# Patient Record
Sex: MALE | Race: WHITE | NOT HISPANIC OR LATINO
[De-identification: names, ages, dates, MRNs, and addresses within clinical notes are randomized per-mention and may not be internally consistent; named-entity substitution may affect disease eponyms.]

---

## 2020-04-28 ENCOUNTER — TRANSCRIPTION ENCOUNTER (OUTPATIENT)
Age: 40
End: 2020-04-28

## 2020-11-27 ENCOUNTER — EMERGENCY (EMERGENCY)
Facility: HOSPITAL | Age: 40
LOS: 0 days | Discharge: HOME | End: 2020-11-27
Attending: EMERGENCY MEDICINE
Payer: COMMERCIAL

## 2020-11-27 VITALS
HEART RATE: 65 BPM | DIASTOLIC BLOOD PRESSURE: 80 MMHG | RESPIRATION RATE: 18 BRPM | SYSTOLIC BLOOD PRESSURE: 124 MMHG | TEMPERATURE: 98 F | OXYGEN SATURATION: 95 %

## 2020-11-27 VITALS — WEIGHT: 160.06 LBS | HEIGHT: 69 IN

## 2020-11-27 DIAGNOSIS — R00.2 PALPITATIONS: ICD-10-CM

## 2020-11-27 DIAGNOSIS — R42 DIZZINESS AND GIDDINESS: ICD-10-CM

## 2020-11-27 LAB
ALBUMIN SERPL ELPH-MCNC: 4.2 G/DL — SIGNIFICANT CHANGE UP (ref 3.3–5)
ALP SERPL-CCNC: 90 U/L — SIGNIFICANT CHANGE UP (ref 40–120)
ALT FLD-CCNC: 31 U/L — SIGNIFICANT CHANGE UP (ref 12–78)
ANION GAP SERPL CALC-SCNC: 4 MMOL/L — LOW (ref 5–17)
AST SERPL-CCNC: 26 U/L — SIGNIFICANT CHANGE UP (ref 15–37)
BASOPHILS # BLD AUTO: 0.05 K/UL — SIGNIFICANT CHANGE UP (ref 0–0.2)
BASOPHILS NFR BLD AUTO: 0.8 % — SIGNIFICANT CHANGE UP (ref 0–2)
BILIRUB SERPL-MCNC: 0.3 MG/DL — SIGNIFICANT CHANGE UP (ref 0.2–1.2)
BUN SERPL-MCNC: 23 MG/DL — SIGNIFICANT CHANGE UP (ref 7–23)
CALCIUM SERPL-MCNC: 9.3 MG/DL — SIGNIFICANT CHANGE UP (ref 8.5–10.1)
CHLORIDE SERPL-SCNC: 109 MMOL/L — HIGH (ref 96–108)
CO2 SERPL-SCNC: 29 MMOL/L — SIGNIFICANT CHANGE UP (ref 22–31)
CREAT SERPL-MCNC: 1.01 MG/DL — SIGNIFICANT CHANGE UP (ref 0.5–1.3)
EOSINOPHIL # BLD AUTO: 0.21 K/UL — SIGNIFICANT CHANGE UP (ref 0–0.5)
EOSINOPHIL NFR BLD AUTO: 3.2 % — SIGNIFICANT CHANGE UP (ref 0–6)
GLUCOSE SERPL-MCNC: 86 MG/DL — SIGNIFICANT CHANGE UP (ref 70–99)
HCT VFR BLD CALC: 41.5 % — SIGNIFICANT CHANGE UP (ref 39–50)
HGB BLD-MCNC: 14.6 G/DL — SIGNIFICANT CHANGE UP (ref 13–17)
IMM GRANULOCYTES NFR BLD AUTO: 0.3 % — SIGNIFICANT CHANGE UP (ref 0–1.5)
LYMPHOCYTES # BLD AUTO: 2.45 K/UL — SIGNIFICANT CHANGE UP (ref 1–3.3)
LYMPHOCYTES # BLD AUTO: 37.2 % — SIGNIFICANT CHANGE UP (ref 13–44)
MAGNESIUM SERPL-MCNC: 2.3 MG/DL — SIGNIFICANT CHANGE UP (ref 1.6–2.6)
MCHC RBC-ENTMCNC: 30.2 PG — SIGNIFICANT CHANGE UP (ref 27–34)
MCHC RBC-ENTMCNC: 35.2 GM/DL — SIGNIFICANT CHANGE UP (ref 32–36)
MCV RBC AUTO: 85.7 FL — SIGNIFICANT CHANGE UP (ref 80–100)
MONOCYTES # BLD AUTO: 0.63 K/UL — SIGNIFICANT CHANGE UP (ref 0–0.9)
MONOCYTES NFR BLD AUTO: 9.6 % — SIGNIFICANT CHANGE UP (ref 2–14)
NEUTROPHILS # BLD AUTO: 3.22 K/UL — SIGNIFICANT CHANGE UP (ref 1.8–7.4)
NEUTROPHILS NFR BLD AUTO: 48.9 % — SIGNIFICANT CHANGE UP (ref 43–77)
NT-PROBNP SERPL-SCNC: 51 PG/ML — SIGNIFICANT CHANGE UP (ref 0–125)
PLATELET # BLD AUTO: 177 K/UL — SIGNIFICANT CHANGE UP (ref 150–400)
POTASSIUM SERPL-MCNC: 3.7 MMOL/L — SIGNIFICANT CHANGE UP (ref 3.5–5.3)
POTASSIUM SERPL-SCNC: 3.7 MMOL/L — SIGNIFICANT CHANGE UP (ref 3.5–5.3)
PROT SERPL-MCNC: 7.1 GM/DL — SIGNIFICANT CHANGE UP (ref 6–8.3)
RBC # BLD: 4.84 M/UL — SIGNIFICANT CHANGE UP (ref 4.2–5.8)
RBC # FLD: 11.7 % — SIGNIFICANT CHANGE UP (ref 10.3–14.5)
SODIUM SERPL-SCNC: 142 MMOL/L — SIGNIFICANT CHANGE UP (ref 135–145)
TROPONIN I SERPL-MCNC: <0.015 NG/ML — SIGNIFICANT CHANGE UP (ref 0.01–0.04)
TROPONIN I SERPL-MCNC: <0.015 NG/ML — SIGNIFICANT CHANGE UP (ref 0.01–0.04)
WBC # BLD: 6.58 K/UL — SIGNIFICANT CHANGE UP (ref 3.8–10.5)
WBC # FLD AUTO: 6.58 K/UL — SIGNIFICANT CHANGE UP (ref 3.8–10.5)

## 2020-11-27 PROCEDURE — 83880 ASSAY OF NATRIURETIC PEPTIDE: CPT

## 2020-11-27 PROCEDURE — 99284 EMERGENCY DEPT VISIT MOD MDM: CPT

## 2020-11-27 PROCEDURE — 85025 COMPLETE CBC W/AUTO DIFF WBC: CPT

## 2020-11-27 PROCEDURE — 99284 EMERGENCY DEPT VISIT MOD MDM: CPT | Mod: 25

## 2020-11-27 PROCEDURE — 71045 X-RAY EXAM CHEST 1 VIEW: CPT | Mod: 26

## 2020-11-27 PROCEDURE — 36415 COLL VENOUS BLD VENIPUNCTURE: CPT

## 2020-11-27 PROCEDURE — 84443 ASSAY THYROID STIM HORMONE: CPT

## 2020-11-27 PROCEDURE — 71045 X-RAY EXAM CHEST 1 VIEW: CPT

## 2020-11-27 PROCEDURE — 93010 ELECTROCARDIOGRAM REPORT: CPT

## 2020-11-27 PROCEDURE — 84484 ASSAY OF TROPONIN QUANT: CPT

## 2020-11-27 PROCEDURE — 80053 COMPREHEN METABOLIC PANEL: CPT

## 2020-11-27 PROCEDURE — 93005 ELECTROCARDIOGRAM TRACING: CPT

## 2020-11-27 PROCEDURE — 83735 ASSAY OF MAGNESIUM: CPT

## 2020-11-27 NOTE — ED STATDOCS - NS_ ATTENDINGSCRIBEDETAILS _ED_A_ED_FT
I, Timmy Pickering MD,  performed the initial face to face bedside interview with this patient regarding history of present illness, review of symptoms and relevant past medical, social and family history.  I completed an independent physical examination.    The history, relevant review of systems, past medical and surgical history, medical decision making, and physical examination was documented by the scribe in my presence and I attest to the accuracy of the documentation.

## 2020-11-27 NOTE — ED STATDOCS - OBJECTIVE STATEMENT
41 y/o male presents to the ED c/o palpitations since this afternoon. Pt states he was hanging greg light when he felt sudden palpitations and mild dizziness. Pt went inside and tried to relax but with continued rapid and pounding palpitations with dizziness. Denies shortness of breath. Pt has a home pulse ox and showed his HR was in the mid 90s and "consistently above 100". Pt sporadically felt palpitations in the past but would resolve on its own. Had normal physical w/u last year. No current cardiologist. Non smoker. Drink 2 cups of coffee daily. 39 y/o male presents to the ED c/o palpitations since this afternoon. Pt states he was hanging greg light when he felt sudden palpitations and mild dizziness. Pt went inside and tried to relax but with continued rapid and pounding palpitations with dizziness. Denies shortness of breath. Pt has a home pulse ox and showed his HR was in the mid 90s and "consistently above 100" and went up to 180s. Pt sporadically felt palpitations in the past but would resolve on its own but today palpitations lasted longer than normal. Had normal physical w/u last year. No current cardiologist. Non smoker. Drink 2 cups of coffee daily.

## 2020-11-27 NOTE — ED STATDOCS - CARE PROVIDER_API CALL
Farhat Barnes  CARDIOVASCULAR DISEASE  [address], [address]  [place] [address]  Phone: [phone]  Fax: [phone]  Follow Up Time: Urgent

## 2020-11-27 NOTE — ED STATDOCS - PROGRESS NOTE DETAILS
41 y/o M presents with palpitations. Pt reports hx of palpitations 1 or twice a month last 10 seconds. Today he was hanging up greg light around 2pm and felt palpitations and lightheadedness. Pt reports 20 more episodes throughout the afternoon all lasting approx 10 seconds. Pt reports putting HR monitor and showed rate of 180. Denies fever/chills, n/v/d, CP, SOB, abd pain, leg swelling, or other complaints at this time. -Meek Mcgrath PA-C Results reviewed and discussed with pt. pt with no palpitations throughout stay in ED, workup unremarkable, no episodes on cardiac monitor, will dc with cardio FU. Discussed importance of close FU with PMD. Pt asked to return to ED immediately for any new or concerning sx or worsening. Pt acknowledges and understands plan

## 2020-11-27 NOTE — ED STATDOCS - CHPI ED QUALITY
HEART BEAT, SLOW/HEART BEAT, POUNDING pounding/HEART BEAT, SLOW HEART BEAT, POUNDING/heart beat, rapid

## 2020-11-27 NOTE — ED STATDOCS - PATIENT PORTAL LINK FT
You can access the FollowMyHealth Patient Portal offered by [facility] by registering at the following website: [email or web address]. By joining [employer]’s FollowMyHealth portal, you will also be able to view your health information using other applications (apps) compatible with our system.

## 2020-11-27 NOTE — ED ADULT NURSE NOTE - OBJECTIVE STATEMENT
Patient comes to ED for dizziness and palpitations that began this afternoon while hanging greg lights. Patient reports at home taking HR and was showing above 100 and then 1 reading at 180. Patient denies any SOB. Patient reports similar episode in past that resolved on own.

## 2020-12-04 ENCOUNTER — APPOINTMENT (OUTPATIENT)
Dept: CARDIOLOGY | Facility: CLINIC | Age: 40
End: 2020-12-04
Payer: COMMERCIAL

## 2020-12-04 ENCOUNTER — NON-APPOINTMENT (OUTPATIENT)
Age: 40
End: 2020-12-04

## 2020-12-04 VITALS
WEIGHT: 159 LBS | HEART RATE: 67 BPM | DIASTOLIC BLOOD PRESSURE: 86 MMHG | HEIGHT: 68 IN | OXYGEN SATURATION: 98 % | SYSTOLIC BLOOD PRESSURE: 128 MMHG | BODY MASS INDEX: 24.1 KG/M2

## 2020-12-04 DIAGNOSIS — R00.0 TACHYCARDIA, UNSPECIFIED: ICD-10-CM

## 2020-12-04 PROCEDURE — 99072 ADDL SUPL MATRL&STAF TM PHE: CPT

## 2020-12-04 PROCEDURE — 99204 OFFICE O/P NEW MOD 45 MIN: CPT

## 2020-12-04 PROCEDURE — 93000 ELECTROCARDIOGRAM COMPLETE: CPT

## 2020-12-04 NOTE — HISTORY OF PRESENT ILLNESS
[FreeTextEntry1] : Cecil Burnett is a 40-year-old man with no chronic medical problems who presented to the [facility] emergency department on November 27, 2020 with complaints of palpitations and "fluctuating heart rate." He was in his usual state of good health and decorating his house with [place] lights when he suddenly experienced palpitations accompanied by mild dizziness.  He describes multiple episodes of palpitations of this day -- each lasting ~ 10 seconds and resolving spontaneously; unable to identify exacerbating or alleviating factors; home pulse oximeter revealed the presence of tachycardia (heart rate often greater than 100 but went as high as 180 beats per minute).  He has no past history of heart disease.  There has been no recurrence of symptoms since November 27.  He describes a good baseline functional status and exercise capacity. He consumes small amounts of caffeine (1-2 cups of coffee per day).  There is no family history of premature heart disease or sudden cardiac death.\par \par I reviewed hospital data:  Vital signs are temperature 98.1, heart rate 91, blood pressure 126/88, respiratory rate 18, SpO2 98%. Electrocardiogram revealed normal sinus rhythm with sinus arrhythmia at a rate of 90 beats per minute. Labs, including CBC, metabolic panel, and 2 assays of troponin were normal; chest radiograph was also normal - he was discharged after a period of observation.\par

## 2020-12-04 NOTE — REVIEW OF SYSTEMS
[Palpitations] : palpitations [Dizziness] : dizziness [Under Stress] : under stress [Negative] : Heme/Lymph

## 2020-12-04 NOTE — PHYSICAL EXAM
[Normal Appearance] : normal appearance [Well Groomed] : well groomed [General Appearance - In No Acute Distress] : no acute distress [Eyelids - No Xanthelasma] : the eyelids demonstrated no xanthelasmas [Heart Rate And Rhythm] : heart rate and rhythm were normal [Heart Sounds] : normal S1 and S2 [Murmurs] : no murmurs present [Edema] : no peripheral edema present [] : no respiratory distress [Respiration, Rhythm And Depth] : normal respiratory rhythm and effort [Bowel Sounds] : normal bowel sounds [Abnormal Walk] : normal gait [Nail Clubbing] : no clubbing of the fingernails [Cyanosis, Localized] : no localized cyanosis [Skin Color & Pigmentation] : normal skin color and pigmentation [Oriented To Time, Place, And Person] : oriented to person, place, and time [Impaired Insight] : insight and judgment were intact [Affect] : the affect was normal [Mood] : the mood was normal [FreeTextEntry1] : No JVD

## 2020-12-04 NOTE — ASSESSMENT
[FreeTextEntry1] : Cecil Burnett is a healthy 40-year-old man with no chronic medical problems but now experiencing intermittent palpitations with a severe episode on November 27, 2020 resulting in hospital ER evaluation; episodes of paroxysmal and history is suggestive of supraventricular tachycardia.  We discussed various approaches to diagnosis and management -- unfortunately, episodes are so infrequent (less than monthly) that capturing the arrhythmia on ECG will be difficult (we discussed the use of 24-hour Holter monitor, extended length Holter monitor, implantable loop recorder; as well as consumer [employer] devices).  I recommend echocardiography; he would like to defer rhythm monitoring at this time.

## 2020-12-14 ENCOUNTER — APPOINTMENT (OUTPATIENT)
Dept: CARDIOLOGY | Facility: CLINIC | Age: 40
End: 2020-12-14
Payer: COMMERCIAL

## 2020-12-14 PROCEDURE — 93306 TTE W/DOPPLER COMPLETE: CPT

## 2020-12-16 ENCOUNTER — APPOINTMENT (OUTPATIENT)
Dept: CARDIOLOGY | Facility: CLINIC | Age: 40
End: 2020-12-16

## 2020-12-17 ENCOUNTER — EMERGENCY (EMERGENCY)
Facility: HOSPITAL | Age: 40
LOS: 0 days | Discharge: HOME | End: 2020-12-17
Attending: EMERGENCY MEDICINE
Payer: COMMERCIAL

## 2020-12-17 VITALS
HEART RATE: 94 BPM | TEMPERATURE: 99 F | RESPIRATION RATE: 17 BRPM | OXYGEN SATURATION: 100 % | DIASTOLIC BLOOD PRESSURE: 70 MMHG | SYSTOLIC BLOOD PRESSURE: 115 MMHG

## 2020-12-17 VITALS
HEART RATE: 97 BPM | HEIGHT: 69 IN | DIASTOLIC BLOOD PRESSURE: 81 MMHG | SYSTOLIC BLOOD PRESSURE: 113 MMHG | TEMPERATURE: 99 F | RESPIRATION RATE: 15 BRPM

## 2020-12-17 DIAGNOSIS — R55 SYNCOPE AND COLLAPSE: ICD-10-CM

## 2020-12-17 DIAGNOSIS — R00.0 TACHYCARDIA, UNSPECIFIED: ICD-10-CM

## 2020-12-17 DIAGNOSIS — I47.1 SUPRAVENTRICULAR TACHYCARDIA: ICD-10-CM

## 2020-12-17 DIAGNOSIS — E83.39 OTHER DISORDERS OF PHOSPHORUS METABOLISM: ICD-10-CM

## 2020-12-17 DIAGNOSIS — Z20.828 CONTACT WITH AND (SUSPECTED) EXPOSURE TO OTHER VIRAL COMMUNICABLE DISEASES: ICD-10-CM

## 2020-12-17 DIAGNOSIS — R00.2 PALPITATIONS: ICD-10-CM

## 2020-12-17 LAB
ALBUMIN SERPL ELPH-MCNC: 3.6 G/DL — SIGNIFICANT CHANGE UP (ref 3.3–5)
ALP SERPL-CCNC: 82 U/L — SIGNIFICANT CHANGE UP (ref 40–120)
ALT FLD-CCNC: 36 U/L — SIGNIFICANT CHANGE UP (ref 12–78)
ANION GAP SERPL CALC-SCNC: 3 MMOL/L — LOW (ref 5–17)
APPEARANCE UR: CLEAR — SIGNIFICANT CHANGE UP
AST SERPL-CCNC: 23 U/L — SIGNIFICANT CHANGE UP (ref 15–37)
BASOPHILS # BLD AUTO: 0.03 K/UL — SIGNIFICANT CHANGE UP (ref 0–0.2)
BASOPHILS NFR BLD AUTO: 0.6 % — SIGNIFICANT CHANGE UP (ref 0–2)
BILIRUB SERPL-MCNC: 0.7 MG/DL — SIGNIFICANT CHANGE UP (ref 0.2–1.2)
BILIRUB UR-MCNC: NEGATIVE — SIGNIFICANT CHANGE UP
BUN SERPL-MCNC: 18 MG/DL — SIGNIFICANT CHANGE UP (ref 7–23)
CALCIUM SERPL-MCNC: 8.6 MG/DL — SIGNIFICANT CHANGE UP (ref 8.5–10.1)
CHLORIDE SERPL-SCNC: 114 MMOL/L — HIGH (ref 96–108)
CO2 SERPL-SCNC: 27 MMOL/L — SIGNIFICANT CHANGE UP (ref 22–31)
COLOR SPEC: YELLOW — SIGNIFICANT CHANGE UP
CREAT SERPL-MCNC: 1.03 MG/DL — SIGNIFICANT CHANGE UP (ref 0.5–1.3)
DIFF PNL FLD: NEGATIVE — SIGNIFICANT CHANGE UP
EOSINOPHIL # BLD AUTO: 0.09 K/UL — SIGNIFICANT CHANGE UP (ref 0–0.5)
EOSINOPHIL NFR BLD AUTO: 1.9 % — SIGNIFICANT CHANGE UP (ref 0–6)
GLUCOSE SERPL-MCNC: 97 MG/DL — SIGNIFICANT CHANGE UP (ref 70–99)
GLUCOSE UR QL: NEGATIVE MG/DL — SIGNIFICANT CHANGE UP
HCT VFR BLD CALC: 41.9 % — SIGNIFICANT CHANGE UP (ref 39–50)
HGB BLD-MCNC: 14.4 G/DL — SIGNIFICANT CHANGE UP (ref 13–17)
IMM GRANULOCYTES NFR BLD AUTO: 0.2 % — SIGNIFICANT CHANGE UP (ref 0–1.5)
KETONES UR-MCNC: NEGATIVE — SIGNIFICANT CHANGE UP
LEUKOCYTE ESTERASE UR-ACNC: NEGATIVE — SIGNIFICANT CHANGE UP
LYMPHOCYTES # BLD AUTO: 1.22 K/UL — SIGNIFICANT CHANGE UP (ref 1–3.3)
LYMPHOCYTES # BLD AUTO: 26.4 % — SIGNIFICANT CHANGE UP (ref 13–44)
MAGNESIUM SERPL-MCNC: 1.8 MG/DL — SIGNIFICANT CHANGE UP (ref 1.6–2.6)
MCHC RBC-ENTMCNC: 30.1 PG — SIGNIFICANT CHANGE UP (ref 27–34)
MCHC RBC-ENTMCNC: 34.4 GM/DL — SIGNIFICANT CHANGE UP (ref 32–36)
MCV RBC AUTO: 87.5 FL — SIGNIFICANT CHANGE UP (ref 80–100)
MONOCYTES # BLD AUTO: 0.47 K/UL — SIGNIFICANT CHANGE UP (ref 0–0.9)
MONOCYTES NFR BLD AUTO: 10.2 % — SIGNIFICANT CHANGE UP (ref 2–14)
NEUTROPHILS # BLD AUTO: 2.8 K/UL — SIGNIFICANT CHANGE UP (ref 1.8–7.4)
NEUTROPHILS NFR BLD AUTO: 60.7 % — SIGNIFICANT CHANGE UP (ref 43–77)
NITRITE UR-MCNC: NEGATIVE — SIGNIFICANT CHANGE UP
PH UR: 8 — SIGNIFICANT CHANGE UP (ref 5–8)
PHOSPHATE SERPL-MCNC: 1.1 MG/DL — LOW (ref 2.5–4.5)
PLATELET # BLD AUTO: 160 K/UL — SIGNIFICANT CHANGE UP (ref 150–400)
POTASSIUM SERPL-MCNC: 4.1 MMOL/L — SIGNIFICANT CHANGE UP (ref 3.5–5.3)
POTASSIUM SERPL-SCNC: 4.1 MMOL/L — SIGNIFICANT CHANGE UP (ref 3.5–5.3)
PROT SERPL-MCNC: 6.6 GM/DL — SIGNIFICANT CHANGE UP (ref 6–8.3)
PROT UR-MCNC: NEGATIVE MG/DL — SIGNIFICANT CHANGE UP
RBC # BLD: 4.79 M/UL — SIGNIFICANT CHANGE UP (ref 4.2–5.8)
RBC # FLD: 11.6 % — SIGNIFICANT CHANGE UP (ref 10.3–14.5)
SARS-COV-2 RNA SPEC QL NAA+PROBE: SIGNIFICANT CHANGE UP
SODIUM SERPL-SCNC: 144 MMOL/L — SIGNIFICANT CHANGE UP (ref 135–145)
SP GR SPEC: 1.01 — SIGNIFICANT CHANGE UP (ref 1.01–1.02)
TROPONIN I SERPL-MCNC: <0.015 NG/ML — SIGNIFICANT CHANGE UP (ref 0.01–0.04)
TSH SERPL-MCNC: 2.43 UU/ML — SIGNIFICANT CHANGE UP (ref 0.34–4.82)
UROBILINOGEN FLD QL: NEGATIVE MG/DL — SIGNIFICANT CHANGE UP
WBC # BLD: 4.62 K/UL — SIGNIFICANT CHANGE UP (ref 3.8–10.5)
WBC # FLD AUTO: 4.62 K/UL — SIGNIFICANT CHANGE UP (ref 3.8–10.5)

## 2020-12-17 PROCEDURE — 99284 EMERGENCY DEPT VISIT MOD MDM: CPT

## 2020-12-17 PROCEDURE — 71046 X-RAY EXAM CHEST 2 VIEWS: CPT | Mod: 26

## 2020-12-17 PROCEDURE — 99284 EMERGENCY DEPT VISIT MOD MDM: CPT | Mod: 25

## 2020-12-17 PROCEDURE — 71046 X-RAY EXAM CHEST 2 VIEWS: CPT

## 2020-12-17 PROCEDURE — 96361 HYDRATE IV INFUSION ADD-ON: CPT

## 2020-12-17 PROCEDURE — 84443 ASSAY THYROID STIM HORMONE: CPT

## 2020-12-17 PROCEDURE — 83735 ASSAY OF MAGNESIUM: CPT

## 2020-12-17 PROCEDURE — 85025 COMPLETE CBC W/AUTO DIFF WBC: CPT

## 2020-12-17 PROCEDURE — 93010 ELECTROCARDIOGRAM REPORT: CPT

## 2020-12-17 PROCEDURE — 81003 URINALYSIS AUTO W/O SCOPE: CPT

## 2020-12-17 PROCEDURE — U0003: CPT

## 2020-12-17 PROCEDURE — 96365 THER/PROPH/DIAG IV INF INIT: CPT

## 2020-12-17 PROCEDURE — 84100 ASSAY OF PHOSPHORUS: CPT

## 2020-12-17 PROCEDURE — 93005 ELECTROCARDIOGRAM TRACING: CPT

## 2020-12-17 PROCEDURE — 84484 ASSAY OF TROPONIN QUANT: CPT

## 2020-12-17 PROCEDURE — 80053 COMPREHEN METABOLIC PANEL: CPT

## 2020-12-17 PROCEDURE — 36415 COLL VENOUS BLD VENIPUNCTURE: CPT

## 2020-12-17 RX ORDER — METOPROLOL TARTRATE 50 MG
25 TABLET ORAL DAILY
Refills: 0 | Status: DISCONTINUED | OUTPATIENT
Start: 2020-12-17 | End: 2020-12-17

## 2020-12-17 RX ORDER — METOPROLOL TARTRATE 50 MG
1 TABLET ORAL
Qty: 30 | Refills: 0
Start: 2020-12-17 | End: 2021-01-15

## 2020-12-17 RX ORDER — SODIUM CHLORIDE 9 MG/ML
1000 INJECTION INTRAMUSCULAR; INTRAVENOUS; SUBCUTANEOUS ONCE
Refills: 0 | Status: COMPLETED | OUTPATIENT
Start: 2020-12-17 | End: 2020-12-17

## 2020-12-17 RX ORDER — SODIUM,POTASSIUM PHOSPHATES 278-250MG
2 POWDER IN PACKET (EA) ORAL ONCE
Refills: 0 | Status: COMPLETED | OUTPATIENT
Start: 2020-12-17 | End: 2020-12-17

## 2020-12-17 RX ORDER — MAGNESIUM SULFATE 500 MG/ML
1 VIAL (ML) INJECTION ONCE
Refills: 0 | Status: COMPLETED | OUTPATIENT
Start: 2020-12-17 | End: 2020-12-17

## 2020-12-17 RX ADMIN — Medication 100 GRAM(S): at 12:23

## 2020-12-17 RX ADMIN — Medication 25 MILLIGRAM(S): at 12:32

## 2020-12-17 RX ADMIN — SODIUM CHLORIDE 2000 MILLILITER(S): 9 INJECTION INTRAMUSCULAR; INTRAVENOUS; SUBCUTANEOUS at 11:10

## 2020-12-17 RX ADMIN — SODIUM CHLORIDE 1000 MILLILITER(S): 9 INJECTION INTRAMUSCULAR; INTRAVENOUS; SUBCUTANEOUS at 12:10

## 2020-12-17 RX ADMIN — Medication 1 GRAM(S): at 13:23

## 2020-12-17 RX ADMIN — Medication 2 PACKET(S): at 12:23

## 2020-12-17 NOTE — ED ADULT TRIAGE NOTE - CHIEF COMPLAINT QUOTE
felt dizzy with runs of tachycardia, resolved on his own. hx of this for past 10 years, already seen cardiologist with no etiology.

## 2020-12-17 NOTE — ED ADULT NURSE NOTE - OBJECTIVE STATEMENT
Patient is a 40 yr old male biba for  near syncopal episode. Today after shoveling snow pt started to have palpitations, became weak and dropped to ground and almost fainted.  Patient was in SVT in 180s and EMS was about to give Adenosine when heart rate broke. No chest pain or headache. Non smoker. No illegal drug use. No alcohol use. Continued monitoring at this time

## 2020-12-17 NOTE — ED PROVIDER NOTE - PATIENT PORTAL LINK FT
You can access the FollowMyHealth Patient Portal offered by [facility] by registering at the following website: [email or web address]. By joining [employer]’s FollowMyHealth portal, you will also be able to view your health information using other applications (apps) compatible with our system.

## 2020-12-17 NOTE — ED PROVIDER NOTE - NSFOLLOWUPINSTRUCTIONS_ED_ALL_ED_FT
Please call and follow up with your cardiologist in 1-3 days.      Supraventricular Tachycardia    WHAT YOU NEED TO KNOW:    What is supraventricular tachycardia (SVT)? SVT is a condition that causes your heart to beat much faster than it should. SVT is a type of abnormal heart rhythm, called an arrhythmia, that starts in the upper part of your heart. It may last from a few seconds or hours to several days.    Heart Chambers         What increases my risk for SVT?   •Health conditions such as anemia, a thyroid disorder, or heart problems      •Drinking caffeine, herbs, or using dietary supplements      •Dehydration      •Pregnancy      •Exercise, fever, or stress      •Smoking, drinking alcohol, or using illegal drugs such as cocaine      •Certain medicines, such as some heart medicines, bronchodilators, and decongestants      What are the signs and symptoms of SVT?   •A pounding, racing, or fluttering heartbeat      •Fatigue, weakness, or shortness of breath      •Feeling lightheaded, dizzy, or faint      •Pain, pressure, or tightness in your chest, neck, jaw, arms, or upper back      •Nausea      •Feeling anxious, scared, or worried that something bad may happen      How is SVT diagnosed? Your healthcare provider will ask about other health conditions and your symptoms. He or she will also listen to your heart and lungs. You may need any of the following tests:  •Blood or urine tests are done to check for causes of SVT.      •An EKG is a test to record your heart rhythm and how fast your heart beats. It is used to check for problems with your heart. Your healthcare provider may do an EKG when you are resting, then again after you exercise. You may also need to wear a portable heart monitor at home for a short time.      •A chest x-ray shows a picture of your heart and lungs.      •An echocardiogram is a type of ultrasound. Sound waves are used to show the structure and function of your heart.      •A tilt table test checks your heart and blood pressure when your body changes positions.      •An electrophysiology study is a procedure to map the electrical pathways in your heart that control your heartbeat.      How is SVT treated? Treatment will depend on what is causing your SVT and your symptoms. You may not need treatment or you may need any of the following:  •Medicines help control your heart rate and rhythm.      •Vagal maneuvers are ways to use your own body to slow down your heart rate. Your healthcare provider may have bear down like you are having a bowel movement. You healthcare provider may teach you how to do vagal maneuvers so you can do them at home.      •Carotid sinus massage is a type of massage to help slow your heart rate. Your healthcare provider will apply steady pressure to a blood vessel on one side of your neck. Do not do a carotid sinus massage on yourself or anyone else.      •Cardioversion is a procedure to return your heart rate and rhythm to normal. It can be done with medicine or an electric shock. You may need cardioversion if medicine does not work.      •Ablation is a procedure that uses a catheter to damage the small area of your heart that is causing abnormal electrical signals. This will stop the electrical problem and allow your heart to beat regularly.      How do I check my heart rate (pulse)? Your healthcare provider will show you how to check your pulse, and how often to check it. Write down how fast your pulse is and if it feels regular or like it is skipping beats. Also write down the activity you were doing if your heart rate is above 100. Bring the information with you to your follow-up appointment.     How to Take a Pulse         How can I manage or prevent SVT?   •Perform vagal maneuvers as directed when you have symptoms of SVT. Lie down flat and bear down like you are having a bowel movement. Do this for 10 to 30 seconds.      •Do not drink caffeine or alcohol. These can increase your risk for SVT.      •Keep a record of your symptoms. Write down what you ate or what you were doing before an episode of SVT. Also write down anything you did to make the SVT stop. Bring your record to follow up visits with your healthcare provider.       •Eat heart-healthy foods. These include fruits, vegetables, whole-grain breads, low-fat dairy products, beans, lean meats, and fish. Replace butter and margarine with heart-healthy oils such as olive oil and canola oil.             •Exercise regularly and maintain a healthy weight. Ask about the best exercise plan for you. Ask your healthcare provider what a healthy weight is for you. Ask him or her to help you create a safe weight loss plan if you are overweight.  Black Family Walking for Exercise           •Do not smoke. Nicotine and other chemicals in cigarettes and cigars can cause heart and lung damage. Ask your healthcare provider for information if you currently smoke and need help to quit. E-cigarettes or smokeless tobacco still contain nicotine. Talk to your healthcare provider before you use these products.      •Manage other health conditions. Take medicine as directed and follow your treatment plan. Your healthcare provider may need to change a medicine you are taking if it is causing your SVT. Do not stop taking any medicine unless directed by your provider.      Call your local emergency number (911 in the [place]) or have someone call if:   •You have any of the following signs of a heart attack: ?Squeezing, pressure, or pain in your chest      ?You may also have any of the following: ?Discomfort or pain in your back, neck, jaw, stomach, or arm      ?Shortness of breath      ?Nausea or vomiting      ?Lightheadedness or a sudden cold sweat            When should I seek immediate care?   •You are dizzy, lightheaded, or feel faint.      •You have sudden numbness or weakness in your arms or legs.      When should I call my doctor?   •You have new or worsening symptoms.      •You have swelling in your ankles or feet.      •You have questions or concerns about your condition or care.      CARE AGREEMENT:    You have the right to help plan your care. Learn about your health condition and how it may be treated. Discuss treatment options with your healthcare providers to decide what care you want to receive. You always have the right to refuse treatment.        Hypophosphatemia    AMBULATORY CARE:    Hypophosphatemia is a low level of phosphate in your blood. Phosphate is an electrolyte (mineral) that works with calcium to help build bones. It also helps produce energy. Hypophosphatemia can be acute or chronic. Acute means the level in your blood drops suddenly. Chronic means the level has been low or drops slowly, over time.    Common signs and symptoms: You may not have any signs or symptoms at first. If the condition becomes more severe, you may develop any of the following:   •Lack of energy, or an energy drop      •Being irritable      •Muscle weakness or pain, trouble walking, or tremors      •Confusion or seizures      •Bone pain and fractures from weakened bones      Call your local emergency number (911 in the [place]) or have someone call if:   •You have a seizure.          Seek care immediately if:   •You are confused or have severe trouble thinking.      •You break a bone.      Call your doctor if:   •You have new or worsening symptoms.      •You have nausea or are vomiting.      •You have diarrhea or constipation that continues for more than 2 days.      •You have muscle pain.      •You have questions or concerns about your condition or care.      Treatment is not needed if you do not have symptoms or your condition is mild. If you develop symptoms, treatment will depend on the cause:   •Your phosphate level will be increased. Your healthcare provider may recommend that you have more food or drinks that contain phosphate. Examples include breads that contain yeast, dairy products, meat, eggs, peas, nuts, and beans. Your provider or a dietitian can tell you how much of these to have each day. Medicine may be given to increase your phosphate level. You may be able to take this as a pill at home. You will need to be admitted to the hospital if you need to get this medicine through an IV. Medicines may also be used to lower your calcium level or to raise other mineral levels.      •Medicines may need to be changed or stopped. Your healthcare provider will review all of your medicines with you. He or she may recommend that you change or stop taking medicines that are causing your symptoms.      •Health conditions may need to be treated. If you were brought into the hospital because DKA caused hypophosphatemia, the DKA will be treated. If it was caused by malnutrition or lack of vitamin, you will be given the nutrients or vitamin you need.      Prevent or manage hypophosphatemia:   •Manage health conditions that can lead to hypophosphatemia. If you have diabetes, it is important to follow your management plan so you prevent DKA. Ask your healthcare provider for information if you are having problems with alcoholism and need help to stop drinking. Obesity and eating disorders such as bulimia or anorexia can cause malnutrition. This increases your risk for hypophosphatemia. Your provider can help you manage these health conditions or give you information on treatment plans.      •Do not take more antacids or water pills than directed. Follow the directions on the label or that are given to you by your healthcare provider. These medicines are often available without a prescription. It can be easy to take too many at one time or in the same day.      •Eat a variety of healthy foods. Healthy foods include fruits, vegetables, low-fat dairy foods, beans, meats, fish, and whole-wheat breads and cereals. You can prevent malnutrition by eating enough healthy foods every day. Your healthcare provider or dietitian can help you plan meals and snacks.  Healthy Foods           •Exercise as directed. Your phosphate level may drop suddenly if you exercise too much or too intensely. Always warm up before you exercise and cool down after. Your healthcare provider can help you create a safe exercise plan.  Walking for Exercise           •Drink liquids as directed. Liquids help your kidneys function well. Liquids can also help prevent dehydration, especially if you have diarrhea. Talk to your healthcare provider about how much liquid to have every day. Too much or too little liquid can affect the balance of phosphate and other minerals in your body.      Follow up with your healthcare provider as directed: Write down your questions so you remember to ask them during your visits.

## 2020-12-17 NOTE — ED PROVIDER NOTE - PROGRESS NOTE DETAILS
Con Koroma: Dr. Monteiro cardiology paged Con Koroma: Discussed with Dr. Monteiro, will see pt in the ED Con Koroma: Dr. Monteiro evaluated pt, can be DC on Metoprolol XL 25mg once a day Attending Koroma, pt updated on results and in NAD.  Plan d/c and follow up with cardiology

## 2020-12-17 NOTE — ED PROVIDER NOTE - OBJECTIVE STATEMENT
41 y/o male with no pertinent PMHx, presents to the ED BIBA c/o near syncopal episode. States for the last 10 years he has been dealing with a fast heart rate on and off. Pt's wife developed COVID-19 in November, but pt has never been sick. States each episodes of palpitations lasting longer. Pt f/u with Dr. Whitfield and Dr. Driscoll with echocardiogram, cardio stress test and Holter monitor. Today after shoveling snow pt started to have palpitations, became weak and dropped to ground and almost fainted. Ambulance was called and pt was seen in SVT in 180s was about to give Adenosine when heart rate broke. No chest pain or headache. Non smoker. No illegal drug use. No alcohol use.

## 2020-12-17 NOTE — ED PROVIDER NOTE - CARE PROVIDER_API CALL
Binh Monteiro  CARDIOVASCULAR DISEASE  [address] [place] [address]  Phone: [phone]  Fax: [phone]  Follow Up Time:

## 2020-12-17 NOTE — ED PROVIDER NOTE - CLINICAL SUMMARY MEDICAL DECISION MAKING FREE TEXT BOX
pt presents with episodes of fast heart rate, up to 200s on pulse ox at home. Currently pt in sinus, will check labs, CXR, and IV hydration.

## 2020-12-17 NOTE — CONSULT NOTE ADULT - PROBLEM SELECTOR RECOMMENDATION 9
Intermittent palpitations related to paroxysms of SVT -- all episodes are self limited and have resolved spontaneously.

## 2020-12-17 NOTE — ED ADULT NURSE NOTE - NSIMPLEMENTINTERV_GEN_ALL_ED
Implemented All Fall Risk Interventions:  Solo to call system. Call bell, personal items and telephone within reach. Instruct patient to call for assistance. Room bathroom lighting operational. Non-slip footwear when patient is off stretcher. Physically safe environment: no spills, clutter or unnecessary equipment. Stretcher in lowest position, wheels locked, appropriate side rails in place. Provide visual cue, wrist band, yellow gown, etc. Monitor gait and stability. Monitor for mental status changes and reorient to person, place, and time. Review medications for side effects contributing to fall risk. Reinforce activity limits and safety measures with patient and family.

## 2020-12-17 NOTE — CONSULT NOTE ADULT - SUBJECTIVE AND OBJECTIVE BOX
CHIEF COMPLAINT: Patient is a 40y old  Male who presents with a chief complaint of palpitations    HPI: 40 year old man with no chronic medical problems who presented to the ED after experiencing palpitations after using a snowblower -- symptoms lasted ~ 30 minutes; unable to identify exacerbating or alleviating factors -- no associated syncope; unable to identify clear exacerbating or alleviating factors.  He saw me in the outpatient setting earlier this month for similar complaints -- I suspected SVT but it had not been captured on ECG.  EMS recorded an episode of SVT and it terminated spontaneously before administering adenosine.  He presently feels well; consumes small amounts of caffeine.    PAST MEDICAL & SURGICAL HISTORY:  No PMHx  S/p hair transplant    SOCIAL HISTORY:   Alcohol: Occasional wine  Smoking: Nonsmoker    FAMILY HISTORY: Atrial fibrillation    Home Medications: None    No Known Allergies    REVIEW OF SYSTEMS:  CONSTITUTIONAL: No weakness, fevers or chills  Eyes: No visual changes  NECK: No pain or stiffness  RESPIRATORY: No cough, wheezing, hemoptysis; No shortness of breath  CARDIOVASCULAR: + palpitations  GASTROINTESTINAL: No abdominal pain. No nausea, vomiting, or hematemesis; No diarrhea or constipation. No melena or hematochezia.  GENITOURINARY: No dysuria, frequency or hematuria  NEUROLOGICAL: No numbness.  SKIN: No itching or rash  All other review of systems is negative unless indicated above    VITAL SIGNS:   Vital Signs Last 24 Hrs  T(C): 37.2 (17 Dec 2020 10:35), Max: 37.2 (17 Dec 2020 10:35)  T(F): 98.9 (17 Dec 2020 10:35), Max: 98.9 (17 Dec 2020 10:35)  HR: 97 (17 Dec 2020 10:35) (97 - 97)  BP: 113/81 (17 Dec 2020 10:35) (113/81 - 113/81)  RR: 15 (17 Dec 2020 10:35) (15 - 15)    PHYSICAL EXAM:  Constitutional: NAD, awake and alert  HEENT:  EOMI,  Pupils round, No oral cyanosis.  Pulmonary: Non-labored, breath sounds are clear bilaterally, No wheezing, rales or rhonchi  Cardiovascular: S1 and S2, regular rate and rhythm, no Murmurs, gallops or rubs  Gastrointestinal: Bowel Sounds present, soft, nontender.   Lymph: No peripheral edema. No cervical lymphadenopathy.  Neurological: Alert, no focal deficits  Skin: No rashes.  Psych:  Mood & affect appropriate    LABS:                    14.4   4.62  )-----------( 160      ( 17 Dec 2020 11:05 )             41.9     144    |  114    |  18     ----------------------------<  97     4.1     |  27     |  1.03     Ca    8.6        17 Dec 2020 11:05  Phos  1.1       17 Dec 2020 11:05  Mg     1.8       17 Dec 2020 11:05    TPro  6.6    /  Alb  3.6    /  TBili  0.7    /  DBili  x      /  AST  23     /  ALT  36     /  AlkPhos  82     17 Dec 2020 11:05    CARDIAC MARKERS ( 17 Dec 2020 11:05 ) <0.015 ng/mL / x     / x     / x     / x        ECG: NSR    Tele Strip (EMS): SVT          12-17 @ 11:05  TSH: 2.43

## 2021-02-03 PROBLEM — Z78.9 OTHER SPECIFIED HEALTH STATUS: Chronic | Status: ACTIVE | Noted: 2020-12-17

## 2021-02-05 ENCOUNTER — NON-APPOINTMENT (OUTPATIENT)
Age: 41
End: 2021-02-05

## 2021-02-05 ENCOUNTER — APPOINTMENT (OUTPATIENT)
Dept: CARDIOLOGY | Facility: CLINIC | Age: 41
End: 2021-02-05
Payer: COMMERCIAL

## 2021-02-05 VITALS
WEIGHT: 169 LBS | BODY MASS INDEX: 25.61 KG/M2 | OXYGEN SATURATION: 98 % | DIASTOLIC BLOOD PRESSURE: 78 MMHG | TEMPERATURE: 97.9 F | SYSTOLIC BLOOD PRESSURE: 113 MMHG | HEART RATE: 92 BPM | HEIGHT: 68 IN

## 2021-02-05 PROCEDURE — 93000 ELECTROCARDIOGRAM COMPLETE: CPT

## 2021-02-05 PROCEDURE — 99072 ADDL SUPL MATRL&STAF TM PHE: CPT

## 2021-02-05 PROCEDURE — 99214 OFFICE O/P EST MOD 30 MIN: CPT

## 2021-02-05 NOTE — REASON FOR VISIT
[Follow-Up - Clinic] : a clinic follow-up of [Medication Management] : Medication management [Palpitations] : palpitations [Supraventricular Tachycardia] : supraventricular tachycardia

## 2021-02-05 NOTE — REVIEW OF SYSTEMS
[see HPI] : see HPI [Palpitations] : palpitations [Heart Rate Is Slow] : the heart rate was not slow [Fainting] : no fainting [Under Stress] : under stress

## 2021-02-05 NOTE — PHYSICAL EXAM
[Normal Appearance] : normal appearance [Well Groomed] : well groomed [General Appearance - In No Acute Distress] : no acute distress [FreeTextEntry1] : no JVD [Respiration, Rhythm And Depth] : normal respiratory rhythm and effort [Heart Rate And Rhythm] : heart rate and rhythm were normal [Heart Sounds] : normal S1 and S2 [Murmurs] : no murmurs present [Skin Color & Pigmentation] : normal skin color and pigmentation [Oriented To Time, Place, And Person] : oriented to person, place, and time [Impaired Insight] : insight and judgment were intact [Affect] : the affect was normal [Mood] : the mood was normal

## 2021-02-05 NOTE — DISCUSSION/SUMMARY
[FreeTextEntry1] : \par Supraventricular tachycardia: Multiple symptomatic episodes (with daily palpitations) at high heart rates despite initiation of metoprolol several months ago.  I recommended that he contact Dr. Saucedo so that he can review recent [facility] report (I printed a copy) and likely schedule an ablation; in the interim, I suggested that he increase his dose of metoprolol to 37.5 mg daily.

## 2021-02-05 NOTE — HISTORY OF PRESENT ILLNESS
[FreeTextEntry1] : Cecil Burnett is a 40-year-old man with no chronic medical problems who I originally met in December 2020 when he presented for evaluation of palpitations. I subsequently met him in the [facility] emergency department following a sustained episode of palpitations due to paroxysmal supraventricular tachycardia.  At that time I initiated beta blockade with metoprolol succinate CR 25 mg daily and he felt somewhat better but palpitations persisted. A 4 day extended length Holter monitor performed in mid January 2021 reveals multiple episodes (47) of supraventricular tachycardia with very rapid rates (longest episode was approximately 2 minutes in duration).  He met with Dr Sheryl tomas time sto discuss an ablation but has not decided how he wants to proceed.  He describes a daily episodes of palpitations - seem to be precipitated by exercise and relieved with breathing maneuvers. He denies syncope. He has been tolerating metoprolol but doesn't "like taking it."

## 2021-09-09 ENCOUNTER — RX RENEWAL (OUTPATIENT)
Age: 41
End: 2021-09-09

## 2021-11-26 ENCOUNTER — RX RENEWAL (OUTPATIENT)
Age: 41
End: 2021-11-26

## 2021-12-17 ENCOUNTER — APPOINTMENT (OUTPATIENT)
Dept: CARDIOLOGY | Facility: CLINIC | Age: 41
End: 2021-12-17

## 2022-06-16 ENCOUNTER — RX RENEWAL (OUTPATIENT)
Age: 42
End: 2022-06-16

## 2022-06-23 ENCOUNTER — RX RENEWAL (OUTPATIENT)
Age: 42
End: 2022-06-23

## 2022-06-27 ENCOUNTER — LABORATORY RESULT (OUTPATIENT)
Age: 42
End: 2022-06-27

## 2022-07-01 ENCOUNTER — APPOINTMENT (OUTPATIENT)
Dept: CARDIOLOGY | Facility: CLINIC | Age: 42
End: 2022-07-01

## 2022-07-22 ENCOUNTER — NON-APPOINTMENT (OUTPATIENT)
Age: 42
End: 2022-07-22

## 2022-07-22 ENCOUNTER — APPOINTMENT (OUTPATIENT)
Dept: CARDIOLOGY | Facility: CLINIC | Age: 42
End: 2022-07-22

## 2022-07-22 VITALS
SYSTOLIC BLOOD PRESSURE: 104 MMHG | BODY MASS INDEX: 23.34 KG/M2 | OXYGEN SATURATION: 98 % | DIASTOLIC BLOOD PRESSURE: 82 MMHG | HEIGHT: 68 IN | WEIGHT: 154 LBS | HEART RATE: 92 BPM

## 2022-07-22 DIAGNOSIS — R00.2 PALPITATIONS: ICD-10-CM

## 2022-07-22 DIAGNOSIS — I47.1 SUPRAVENTRICULAR TACHYCARDIA: ICD-10-CM

## 2022-07-22 PROCEDURE — 99214 OFFICE O/P EST MOD 30 MIN: CPT | Mod: 25

## 2022-07-22 PROCEDURE — 93000 ELECTROCARDIOGRAM COMPLETE: CPT

## 2022-07-22 NOTE — DISCUSSION/SUMMARY
[FreeTextEntry1] : \par Supraventricular tachycardia:  Cecil had multiple symptomatic episodes of SVT in 2020/2021 and met with two electrophysiologist who both recommended an ablation -  however, he preferred more conservative management. Palpitations have now resolved resolved (many months with no recurrence following significant lifestyle modification);  it is reasonable to try tapering and ultimately discontinuing metoprolol (he will take half a tablet of metoprolol daily for 2 weeks and if he remains asymptomatic then he will discontinue therapy) - he will call me if palpitations return.\par

## 2022-07-22 NOTE — HISTORY OF PRESENT ILLNESS
[FreeTextEntry1] : Cecil Burnett is a 42-year-old man with history of paroxysmal supraventricular tachycardia who returns for cardiac examination after a long absence – our last encounter was in February 2021.  After our last encounter he sought a second opinion from an electrophysiologist at [facility] ([facility]) regarding the management of his supraventricular tachycardia and cryoablation was recommended; however, Cecil never underwent the procedure.  He has made significant lifestyle modifications over the past 1 year, including daily aerobic exercise, decrease consumption of alcohol and caffeine; weight has decreased.  Palpitations have now completely resolved.  He would like to taper and ultimately discontinue metoprolol if possible.

## 2022-07-22 NOTE — PHYSICAL EXAM
[Normal S1, S2] : normal S1, S2 [Clear Lung Fields] : clear lung fields [Normal Gait] : normal gait [No Edema] : no edema [Alert and Oriented] : alert and oriented [de-identified] :   Appears his stated age and well [de-identified] :  pupils are round [de-identified] :  wearing a facemask

## 2022-07-22 NOTE — REVIEW OF SYSTEMS
[Weight Loss (___ Lbs)] : [unfilled] ~Ulb weight loss [Dyspnea on exertion] : not dyspnea during exertion [Palpitations] : no palpitations

## 2022-08-19 ENCOUNTER — RX CHANGE (OUTPATIENT)
Age: 42
End: 2022-08-19

## 2022-08-19 RX ORDER — METOPROLOL SUCCINATE 25 MG/1
25 TABLET, EXTENDED RELEASE ORAL
Qty: 30 | Refills: 0 | Status: DISCONTINUED | COMMUNITY
Start: 2021-01-07 | End: 2022-08-19

## 2023-11-10 ENCOUNTER — APPOINTMENT (OUTPATIENT)
Dept: CARDIOLOGY | Facility: CLINIC | Age: 43
End: 2023-11-10

## 2023-11-13 ENCOUNTER — APPOINTMENT (OUTPATIENT)
Dept: MRI IMAGING | Facility: CLINIC | Age: 43
End: 2023-11-13
Payer: COMMERCIAL

## 2023-11-13 ENCOUNTER — OUTPATIENT (OUTPATIENT)
Dept: OUTPATIENT SERVICES | Facility: HOSPITAL | Age: 43
LOS: 1 days | End: 2023-11-13
Payer: SELF-PAY

## 2023-11-13 DIAGNOSIS — Z00.8 ENCOUNTER FOR OTHER GENERAL EXAMINATION: ICD-10-CM

## 2023-11-13 PROCEDURE — 72141 MRI NECK SPINE W/O DYE: CPT

## 2023-11-13 PROCEDURE — 72141 MRI NECK SPINE W/O DYE: CPT | Mod: 26

## 2023-11-15 ENCOUNTER — APPOINTMENT (OUTPATIENT)
Dept: MRI IMAGING | Facility: CLINIC | Age: 43
End: 2023-11-15

## 2023-11-17 ENCOUNTER — TRANSCRIPTION ENCOUNTER (OUTPATIENT)
Age: 43
End: 2023-11-17

## 2024-06-17 NOTE — ED ADULT NURSE NOTE - NSFALLRSKOUTCOME_ED_ALL_ED
[Wheezing] : no wheezing [Rales] : no rales [Tachypnea] : no tachypnea [Rhonchi] : rhonchi [Subcostal Retractions] : no subcostal retractions [Suprasternal Retractions] : no suprasternal retractions [NL] : warm, clear Universal Safety Interventions

## 2024-10-19 ENCOUNTER — NON-APPOINTMENT (OUTPATIENT)
Age: 44
End: 2024-10-19